# Patient Record
Sex: MALE | Race: WHITE | NOT HISPANIC OR LATINO | Employment: FULL TIME | URBAN - METROPOLITAN AREA
[De-identification: names, ages, dates, MRNs, and addresses within clinical notes are randomized per-mention and may not be internally consistent; named-entity substitution may affect disease eponyms.]

---

## 2021-05-12 ENCOUNTER — OFFICE VISIT (OUTPATIENT)
Dept: URGENT CARE | Facility: CLINIC | Age: 36
End: 2021-05-12

## 2021-05-12 VITALS
OXYGEN SATURATION: 98 % | BODY MASS INDEX: 28.23 KG/M2 | DIASTOLIC BLOOD PRESSURE: 80 MMHG | SYSTOLIC BLOOD PRESSURE: 120 MMHG | HEART RATE: 94 BPM | RESPIRATION RATE: 16 BRPM | WEIGHT: 220 LBS | TEMPERATURE: 98.8 F | HEIGHT: 74 IN

## 2021-05-12 DIAGNOSIS — K04.7 DENTAL INFECTION: ICD-10-CM

## 2021-05-12 DIAGNOSIS — K02.9 DENTAL CARIES: ICD-10-CM

## 2021-05-12 PROCEDURE — 99203 OFFICE O/P NEW LOW 30 MIN: CPT | Performed by: PHYSICIAN ASSISTANT

## 2021-05-12 RX ORDER — AMOXICILLIN 500 MG/1
500 CAPSULE ORAL 3 TIMES DAILY
Qty: 21 CAPSULE | Refills: 0 | Status: SHIPPED | OUTPATIENT
Start: 2021-05-12 | End: 2021-05-19

## 2021-05-12 ASSESSMENT — ENCOUNTER SYMPTOMS
VOMITING: 0
HEADACHES: 1
DIZZINESS: 0
NAUSEA: 1
SINUS PAIN: 0
CHILLS: 0
FEVER: 0

## 2021-05-13 NOTE — PROGRESS NOTES
"Subjective:   Maico Mccullough is a 36 y.o. male who presents for Dental Pain (x Rt. lower tooth pain)      HPI  36 y.o. male presents to urgent care with new problem to provider of right-sided lower dental pain secondary to broken tooth.  Patient reports he bit into something a few days ago and felt a crunch and felt a sudden onset of pain and now has worsening redness and swelling around this tooth.  He does report that this tooth had previously been broken but is now worse.  He has not been on antibiotics previously for this.  He is visiting and does not have a dentist currently in town.  Patient denies fevers.  He reports headaches with pain that radiates into the neck. Denies other associated aggravating or alleviating factors.       Review of Systems   Constitutional: Negative for chills and fever.   HENT: Negative for congestion and sinus pain.         Dental pain   Gastrointestinal: Positive for nausea. Negative for vomiting.   Neurological: Positive for headaches. Negative for dizziness.       There is no problem list on file for this patient.    History reviewed. No pertinent surgical history.  Social History     Tobacco Use   • Smoking status: Never Smoker   • Smokeless tobacco: Never Used   Substance Use Topics   • Alcohol use: Not on file   • Drug use: Not on file      History reviewed. No pertinent family history.   (Allergies, Medications, & Tobacco/Substance Use were reconciled by the Medical Assistant and reviewed by myself. The family history is prepopulated)     Objective:     /80 (BP Location: Left arm, Patient Position: Sitting, BP Cuff Size: Large adult)   Pulse 94   Temp 37.1 °C (98.8 °F) (Temporal)   Resp 16   Ht 1.88 m (6' 2\")   Wt 99.8 kg (220 lb)   SpO2 98%   BMI 28.25 kg/m²     Physical Exam  Vitals reviewed.   Constitutional:       Appearance: Normal appearance. He is well-developed.   HENT:      Head: Normocephalic and atraumatic.      Mouth/Throat:      Mouth: Mucous " membranes are moist.      Pharynx: Oropharynx is clear.     Eyes:      Conjunctiva/sclera: Conjunctivae normal.   Cardiovascular:      Rate and Rhythm: Normal rate and regular rhythm.      Heart sounds: Normal heart sounds.   Pulmonary:      Effort: Pulmonary effort is normal. No respiratory distress.      Breath sounds: Normal breath sounds.   Musculoskeletal:      Cervical back: Normal range of motion and neck supple.   Skin:     General: Skin is warm and dry.   Neurological:      General: No focal deficit present.      Mental Status: He is alert and oriented to person, place, and time.   Psychiatric:         Mood and Affect: Mood normal.         Behavior: Behavior normal.         Thought Content: Thought content normal.         Judgment: Judgment normal.         Assessment/Plan:     1. Dental infection  amoxicillin (AMOXIL) 500 MG Cap   2. Dental caries  amoxicillin (AMOXIL) 500 MG Cap     Recommend 7-day course of amoxicillin.  Patient should follow-up in 2 days if his signs of infection are not improving.  Patient is not covered by insurance and I suggest he use my chart in case antibiotic treatment selection needs to be tailored.  Patient also given resources for local dentistry as he will be here for an extended period of time for work.  Alternate Tylenol/Motrin for pain.  Monitor for fevers.    Differential diagnosis, natural history, supportive care, and indications for immediate follow-up discussed.    Advised the patient to follow-up with the primary care physician for recheck, reevaluation, and consideration of further management.  Patient verbalized understanding of treatment plan and has no further questions regarding care.     I personally reviewed prior external notes and test results pertinent to today's visit.     Please note that this dictation was created using voice recognition software. I have made a reasonable attempt to correct obvious errors, but I expect that there are errors of grammar and  possibly content that I did not discover before finalizing the note.    This note was electronically signed by Nohelia Casarez PA-C